# Patient Record
Sex: FEMALE | HISPANIC OR LATINO | Employment: UNEMPLOYED | URBAN - METROPOLITAN AREA
[De-identification: names, ages, dates, MRNs, and addresses within clinical notes are randomized per-mention and may not be internally consistent; named-entity substitution may affect disease eponyms.]

---

## 2020-01-26 ENCOUNTER — APPOINTMENT (OUTPATIENT)
Dept: RADIOLOGY | Facility: MEDICAL CENTER | Age: 50
End: 2020-01-26
Attending: EMERGENCY MEDICINE
Payer: COMMERCIAL

## 2020-01-26 ENCOUNTER — HOSPITAL ENCOUNTER (OUTPATIENT)
Facility: MEDICAL CENTER | Age: 50
End: 2020-01-27
Attending: EMERGENCY MEDICINE | Admitting: SURGERY
Payer: COMMERCIAL

## 2020-01-26 DIAGNOSIS — K81.9 CHOLECYSTITIS: ICD-10-CM

## 2020-01-26 DIAGNOSIS — K81.0 ACUTE CHOLECYSTITIS: ICD-10-CM

## 2020-01-26 PROBLEM — D72.829 LEUKOCYTOSIS: Status: ACTIVE | Noted: 2020-01-26

## 2020-01-26 PROBLEM — E87.6 HYPOKALEMIA: Status: ACTIVE | Noted: 2020-01-26

## 2020-01-26 PROBLEM — I16.0 HYPERTENSIVE URGENCY: Status: ACTIVE | Noted: 2020-01-26

## 2020-01-26 PROBLEM — R73.9 HYPERGLYCEMIA: Status: ACTIVE | Noted: 2020-01-26

## 2020-01-26 LAB
ALBUMIN SERPL BCP-MCNC: 4.5 G/DL (ref 3.2–4.9)
ALBUMIN/GLOB SERPL: 1 G/DL
ALP SERPL-CCNC: 92 U/L (ref 30–99)
ALT SERPL-CCNC: 36 U/L (ref 2–50)
ANION GAP SERPL CALC-SCNC: 15 MMOL/L (ref 0–11.9)
APPEARANCE UR: CLEAR
APTT PPP: 30 SEC (ref 24.7–36)
AST SERPL-CCNC: 27 U/L (ref 12–45)
BACTERIA #/AREA URNS HPF: ABNORMAL /HPF
BASOPHILS # BLD AUTO: 0.3 % (ref 0–1.8)
BASOPHILS # BLD: 0.04 K/UL (ref 0–0.12)
BILIRUB SERPL-MCNC: 0.5 MG/DL (ref 0.1–1.5)
BILIRUB UR QL STRIP.AUTO: NEGATIVE
BUN SERPL-MCNC: 9 MG/DL (ref 8–22)
CALCIUM SERPL-MCNC: 9.7 MG/DL (ref 8.5–10.5)
CHLORIDE SERPL-SCNC: 100 MMOL/L (ref 96–112)
CO2 SERPL-SCNC: 19 MMOL/L (ref 20–33)
COLOR UR: YELLOW
CREAT SERPL-MCNC: 0.62 MG/DL (ref 0.5–1.4)
EOSINOPHIL # BLD AUTO: 0.01 K/UL (ref 0–0.51)
EOSINOPHIL NFR BLD: 0.1 % (ref 0–6.9)
EPI CELLS #/AREA URNS HPF: ABNORMAL /HPF
ERYTHROCYTE [DISTWIDTH] IN BLOOD BY AUTOMATED COUNT: 42.1 FL (ref 35.9–50)
GLOBULIN SER CALC-MCNC: 4.4 G/DL (ref 1.9–3.5)
GLUCOSE SERPL-MCNC: 143 MG/DL (ref 65–99)
GLUCOSE UR STRIP.AUTO-MCNC: NEGATIVE MG/DL
HCT VFR BLD AUTO: 44.7 % (ref 37–47)
HGB BLD-MCNC: 15.1 G/DL (ref 12–16)
HYALINE CASTS #/AREA URNS LPF: ABNORMAL /LPF
IMM GRANULOCYTES # BLD AUTO: 0.06 K/UL (ref 0–0.11)
IMM GRANULOCYTES NFR BLD AUTO: 0.4 % (ref 0–0.9)
INR PPP: 1.07 (ref 0.87–1.13)
KETONES UR STRIP.AUTO-MCNC: ABNORMAL MG/DL
LEUKOCYTE ESTERASE UR QL STRIP.AUTO: NEGATIVE
LIPASE SERPL-CCNC: 10 U/L (ref 11–82)
LYMPHOCYTES # BLD AUTO: 1.78 K/UL (ref 1–4.8)
LYMPHOCYTES NFR BLD: 12.1 % (ref 22–41)
MCH RBC QN AUTO: 29.9 PG (ref 27–33)
MCHC RBC AUTO-ENTMCNC: 33.8 G/DL (ref 33.6–35)
MCV RBC AUTO: 88.5 FL (ref 81.4–97.8)
MICRO URNS: ABNORMAL
MONOCYTES # BLD AUTO: 0.84 K/UL (ref 0–0.85)
MONOCYTES NFR BLD AUTO: 5.7 % (ref 0–13.4)
NEUTROPHILS # BLD AUTO: 12.03 K/UL (ref 2–7.15)
NEUTROPHILS NFR BLD: 81.4 % (ref 44–72)
NITRITE UR QL STRIP.AUTO: NEGATIVE
NRBC # BLD AUTO: 0 K/UL
NRBC BLD-RTO: 0 /100 WBC
PH UR STRIP.AUTO: 6 [PH] (ref 5–8)
PLATELET # BLD AUTO: 360 K/UL (ref 164–446)
PMV BLD AUTO: 10.9 FL (ref 9–12.9)
POTASSIUM SERPL-SCNC: 3.4 MMOL/L (ref 3.6–5.5)
PROT SERPL-MCNC: 8.9 G/DL (ref 6–8.2)
PROT UR QL STRIP: 100 MG/DL
PROTHROMBIN TIME: 14.2 SEC (ref 12–14.6)
RBC # BLD AUTO: 5.05 M/UL (ref 4.2–5.4)
RBC # URNS HPF: ABNORMAL /HPF
RBC UR QL AUTO: ABNORMAL
SODIUM SERPL-SCNC: 134 MMOL/L (ref 135–145)
SP GR UR STRIP.AUTO: 1.02
UROBILINOGEN UR STRIP.AUTO-MCNC: 0.2 MG/DL
WBC # BLD AUTO: 14.8 K/UL (ref 4.8–10.8)
WBC #/AREA URNS HPF: ABNORMAL /HPF

## 2020-01-26 PROCEDURE — 76705 ECHO EXAM OF ABDOMEN: CPT

## 2020-01-26 PROCEDURE — G0378 HOSPITAL OBSERVATION PER HR: HCPCS

## 2020-01-26 PROCEDURE — 85610 PROTHROMBIN TIME: CPT

## 2020-01-26 PROCEDURE — 85025 COMPLETE CBC W/AUTO DIFF WBC: CPT

## 2020-01-26 PROCEDURE — 99226 PR SUBSEQUENT OBSERVATION CARE,LEVEL III: CPT | Performed by: INTERNAL MEDICINE

## 2020-01-26 PROCEDURE — 96375 TX/PRO/DX INJ NEW DRUG ADDON: CPT

## 2020-01-26 PROCEDURE — 96376 TX/PRO/DX INJ SAME DRUG ADON: CPT

## 2020-01-26 PROCEDURE — 85730 THROMBOPLASTIN TIME PARTIAL: CPT

## 2020-01-26 PROCEDURE — 80053 COMPREHEN METABOLIC PANEL: CPT

## 2020-01-26 PROCEDURE — 700111 HCHG RX REV CODE 636 W/ 250 OVERRIDE (IP): Performed by: EMERGENCY MEDICINE

## 2020-01-26 PROCEDURE — 36415 COLL VENOUS BLD VENIPUNCTURE: CPT

## 2020-01-26 PROCEDURE — 700101 HCHG RX REV CODE 250: Performed by: EMERGENCY MEDICINE

## 2020-01-26 PROCEDURE — 96365 THER/PROPH/DIAG IV INF INIT: CPT

## 2020-01-26 PROCEDURE — 83690 ASSAY OF LIPASE: CPT

## 2020-01-26 PROCEDURE — 81001 URINALYSIS AUTO W/SCOPE: CPT

## 2020-01-26 PROCEDURE — 99285 EMERGENCY DEPT VISIT HI MDM: CPT

## 2020-01-26 PROCEDURE — 700105 HCHG RX REV CODE 258: Performed by: EMERGENCY MEDICINE

## 2020-01-26 RX ORDER — DEXTROSE AND SODIUM CHLORIDE 5; .45 G/100ML; G/100ML
INJECTION, SOLUTION INTRAVENOUS CONTINUOUS
Status: DISCONTINUED | OUTPATIENT
Start: 2020-01-26 | End: 2020-01-27 | Stop reason: HOSPADM

## 2020-01-26 RX ORDER — MORPHINE SULFATE 10 MG/ML
5 INJECTION, SOLUTION INTRAMUSCULAR; INTRAVENOUS
Status: COMPLETED | OUTPATIENT
Start: 2020-01-26 | End: 2020-01-27

## 2020-01-26 RX ORDER — ONDANSETRON 2 MG/ML
4 INJECTION INTRAMUSCULAR; INTRAVENOUS ONCE
Status: COMPLETED | OUTPATIENT
Start: 2020-01-26 | End: 2020-01-26

## 2020-01-26 RX ORDER — ONDANSETRON 2 MG/ML
4 INJECTION INTRAMUSCULAR; INTRAVENOUS EVERY 6 HOURS PRN
Status: DISCONTINUED | OUTPATIENT
Start: 2020-01-26 | End: 2020-01-27 | Stop reason: HOSPADM

## 2020-01-26 RX ORDER — ENALAPRILAT 1.25 MG/ML
1.25 INJECTION INTRAVENOUS EVERY 6 HOURS PRN
Status: DISCONTINUED | OUTPATIENT
Start: 2020-01-26 | End: 2020-01-27 | Stop reason: HOSPADM

## 2020-01-26 RX ORDER — LABETALOL HYDROCHLORIDE 5 MG/ML
10 INJECTION, SOLUTION INTRAVENOUS EVERY 4 HOURS PRN
Status: DISCONTINUED | OUTPATIENT
Start: 2020-01-26 | End: 2020-01-27

## 2020-01-26 RX ADMIN — METRONIDAZOLE 500 MG: 500 INJECTION, SOLUTION INTRAVENOUS at 23:17

## 2020-01-26 RX ADMIN — FENTANYL CITRATE 100 MCG: 0.05 INJECTION, SOLUTION INTRAMUSCULAR; INTRAVENOUS at 22:46

## 2020-01-26 RX ADMIN — ONDANSETRON 4 MG: 2 INJECTION INTRAMUSCULAR; INTRAVENOUS at 21:20

## 2020-01-26 RX ADMIN — CEFTRIAXONE SODIUM 1 G: 1 INJECTION, POWDER, FOR SOLUTION INTRAMUSCULAR; INTRAVENOUS at 22:46

## 2020-01-26 RX ADMIN — FENTANYL CITRATE 50 MCG: 0.05 INJECTION, SOLUTION INTRAMUSCULAR; INTRAVENOUS at 21:21

## 2020-01-26 SDOH — HEALTH STABILITY: MENTAL HEALTH: HOW OFTEN DO YOU HAVE A DRINK CONTAINING ALCOHOL?: NEVER

## 2020-01-26 ASSESSMENT — ENCOUNTER SYMPTOMS
ORTHOPNEA: 0
VOMITING: 1
FOCAL WEAKNESS: 0
SPUTUM PRODUCTION: 0
EYE DISCHARGE: 0
CHILLS: 0
HEARTBURN: 0
WEIGHT LOSS: 0
PALPITATIONS: 0
NECK PAIN: 0
EYE REDNESS: 0
DEPRESSION: 0
STRIDOR: 0
BLURRED VISION: 0
FEVER: 0
DIZZINESS: 0
ABDOMINAL PAIN: 1
INSOMNIA: 0
HEADACHES: 0
NERVOUS/ANXIOUS: 0
SEIZURES: 0
MYALGIAS: 0
COUGH: 0
DIARRHEA: 0
NAUSEA: 1
BACK PAIN: 0
EYE PAIN: 0
SHORTNESS OF BREATH: 0

## 2020-01-26 ASSESSMENT — LIFESTYLE VARIABLES: EVER_SMOKED: NEVER

## 2020-01-27 ENCOUNTER — ANESTHESIA EVENT (OUTPATIENT)
Dept: SURGERY | Facility: MEDICAL CENTER | Age: 50
End: 2020-01-27
Payer: COMMERCIAL

## 2020-01-27 ENCOUNTER — ANESTHESIA (OUTPATIENT)
Dept: SURGERY | Facility: MEDICAL CENTER | Age: 50
End: 2020-01-27
Payer: COMMERCIAL

## 2020-01-27 VITALS
TEMPERATURE: 97.2 F | DIASTOLIC BLOOD PRESSURE: 51 MMHG | SYSTOLIC BLOOD PRESSURE: 100 MMHG | BODY MASS INDEX: 32.33 KG/M2 | RESPIRATION RATE: 18 BRPM | HEIGHT: 62 IN | OXYGEN SATURATION: 92 % | WEIGHT: 175.71 LBS | HEART RATE: 78 BPM

## 2020-01-27 PROBLEM — D72.829 LEUKOCYTOSIS: Status: RESOLVED | Noted: 2020-01-26 | Resolved: 2020-01-27

## 2020-01-27 PROBLEM — K81.0 ACUTE CHOLECYSTITIS: Status: RESOLVED | Noted: 2020-01-26 | Resolved: 2020-01-27

## 2020-01-27 PROBLEM — I16.0 HYPERTENSIVE URGENCY: Status: RESOLVED | Noted: 2020-01-26 | Resolved: 2020-01-27

## 2020-01-27 PROBLEM — E87.6 HYPOKALEMIA: Status: RESOLVED | Noted: 2020-01-26 | Resolved: 2020-01-27

## 2020-01-27 PROBLEM — R73.9 HYPERGLYCEMIA: Status: RESOLVED | Noted: 2020-01-26 | Resolved: 2020-01-27

## 2020-01-27 LAB
ALBUMIN SERPL BCP-MCNC: 4.1 G/DL (ref 3.2–4.9)
ALBUMIN/GLOB SERPL: 1.1 G/DL
ALP SERPL-CCNC: 76 U/L (ref 30–99)
ALT SERPL-CCNC: 32 U/L (ref 2–50)
ANION GAP SERPL CALC-SCNC: 12 MMOL/L (ref 0–11.9)
AST SERPL-CCNC: 24 U/L (ref 12–45)
BASOPHILS # BLD AUTO: 0.4 % (ref 0–1.8)
BASOPHILS # BLD: 0.06 K/UL (ref 0–0.12)
BILIRUB SERPL-MCNC: 0.5 MG/DL (ref 0.1–1.5)
BUN SERPL-MCNC: 9 MG/DL (ref 8–22)
CALCIUM SERPL-MCNC: 8.9 MG/DL (ref 8.5–10.5)
CHLORIDE SERPL-SCNC: 101 MMOL/L (ref 96–112)
CO2 SERPL-SCNC: 19 MMOL/L (ref 20–33)
CREAT SERPL-MCNC: 0.68 MG/DL (ref 0.5–1.4)
EOSINOPHIL # BLD AUTO: 0.07 K/UL (ref 0–0.51)
EOSINOPHIL NFR BLD: 0.5 % (ref 0–6.9)
ERYTHROCYTE [DISTWIDTH] IN BLOOD BY AUTOMATED COUNT: 43.8 FL (ref 35.9–50)
GLOBULIN SER CALC-MCNC: 3.6 G/DL (ref 1.9–3.5)
GLUCOSE SERPL-MCNC: 152 MG/DL (ref 65–99)
HCT VFR BLD AUTO: 42.9 % (ref 37–47)
HGB BLD-MCNC: 14.2 G/DL (ref 12–16)
IMM GRANULOCYTES # BLD AUTO: 0.08 K/UL (ref 0–0.11)
IMM GRANULOCYTES NFR BLD AUTO: 0.5 % (ref 0–0.9)
LYMPHOCYTES # BLD AUTO: 1.97 K/UL (ref 1–4.8)
LYMPHOCYTES NFR BLD: 12.9 % (ref 22–41)
MCH RBC QN AUTO: 30.1 PG (ref 27–33)
MCHC RBC AUTO-ENTMCNC: 33.1 G/DL (ref 33.6–35)
MCV RBC AUTO: 91.1 FL (ref 81.4–97.8)
MONOCYTES # BLD AUTO: 1.33 K/UL (ref 0–0.85)
MONOCYTES NFR BLD AUTO: 8.7 % (ref 0–13.4)
NEUTROPHILS # BLD AUTO: 11.74 K/UL (ref 2–7.15)
NEUTROPHILS NFR BLD: 77 % (ref 44–72)
NRBC # BLD AUTO: 0 K/UL
NRBC BLD-RTO: 0 /100 WBC
PATHOLOGY CONSULT NOTE: NORMAL
PLATELET # BLD AUTO: 313 K/UL (ref 164–446)
PMV BLD AUTO: 10.6 FL (ref 9–12.9)
POTASSIUM SERPL-SCNC: 3.3 MMOL/L (ref 3.6–5.5)
PROT SERPL-MCNC: 7.7 G/DL (ref 6–8.2)
RBC # BLD AUTO: 4.71 M/UL (ref 4.2–5.4)
SODIUM SERPL-SCNC: 132 MMOL/L (ref 135–145)
WBC # BLD AUTO: 15.3 K/UL (ref 4.8–10.8)

## 2020-01-27 PROCEDURE — 85025 COMPLETE CBC W/AUTO DIFF WBC: CPT

## 2020-01-27 PROCEDURE — G0378 HOSPITAL OBSERVATION PER HR: HCPCS

## 2020-01-27 PROCEDURE — 700111 HCHG RX REV CODE 636 W/ 250 OVERRIDE (IP): Performed by: INTERNAL MEDICINE

## 2020-01-27 PROCEDURE — 160028 HCHG SURGERY MINUTES - 1ST 30 MINS LEVEL 3: Performed by: SURGERY

## 2020-01-27 PROCEDURE — 700105 HCHG RX REV CODE 258: Performed by: ANESTHESIOLOGY

## 2020-01-27 PROCEDURE — A6402 STERILE GAUZE <= 16 SQ IN: HCPCS | Performed by: SURGERY

## 2020-01-27 PROCEDURE — 160035 HCHG PACU - 1ST 60 MINS PHASE I: Performed by: SURGERY

## 2020-01-27 PROCEDURE — 501838 HCHG SUTURE GENERAL: Performed by: SURGERY

## 2020-01-27 PROCEDURE — 160009 HCHG ANES TIME/MIN: Performed by: SURGERY

## 2020-01-27 PROCEDURE — 80053 COMPREHEN METABOLIC PANEL: CPT

## 2020-01-27 PROCEDURE — 700105 HCHG RX REV CODE 258: Performed by: EMERGENCY MEDICINE

## 2020-01-27 PROCEDURE — 501582 HCHG TROCAR, THRD BLADED: Performed by: SURGERY

## 2020-01-27 PROCEDURE — A9270 NON-COVERED ITEM OR SERVICE: HCPCS | Performed by: NURSE PRACTITIONER

## 2020-01-27 PROCEDURE — 700101 HCHG RX REV CODE 250: Performed by: SURGERY

## 2020-01-27 PROCEDURE — 160039 HCHG SURGERY MINUTES - EA ADDL 1 MIN LEVEL 3: Performed by: SURGERY

## 2020-01-27 PROCEDURE — 700111 HCHG RX REV CODE 636 W/ 250 OVERRIDE (IP): Performed by: ANESTHESIOLOGY

## 2020-01-27 PROCEDURE — 700111 HCHG RX REV CODE 636 W/ 250 OVERRIDE (IP): Performed by: HOSPITALIST

## 2020-01-27 PROCEDURE — A9270 NON-COVERED ITEM OR SERVICE: HCPCS | Performed by: ANESTHESIOLOGY

## 2020-01-27 PROCEDURE — 501584 HCHG TROCAR, THRD CAN&SEAL11X100: Performed by: SURGERY

## 2020-01-27 PROCEDURE — 36415 COLL VENOUS BLD VENIPUNCTURE: CPT

## 2020-01-27 PROCEDURE — 160036 HCHG PACU - EA ADDL 30 MINS PHASE I: Performed by: SURGERY

## 2020-01-27 PROCEDURE — 96375 TX/PRO/DX INJ NEW DRUG ADDON: CPT

## 2020-01-27 PROCEDURE — 700102 HCHG RX REV CODE 250 W/ 637 OVERRIDE(OP): Performed by: ANESTHESIOLOGY

## 2020-01-27 PROCEDURE — 501572 HCHG TROCAR, SHIELD OBTU 5X100: Performed by: SURGERY

## 2020-01-27 PROCEDURE — 501338 HCHG SHEARS, ENDO: Performed by: SURGERY

## 2020-01-27 PROCEDURE — 501583 HCHG TROCAR, THRD CAN&SEAL 5X100: Performed by: SURGERY

## 2020-01-27 PROCEDURE — 160048 HCHG OR STATISTICAL LEVEL 1-5: Performed by: SURGERY

## 2020-01-27 PROCEDURE — 700111 HCHG RX REV CODE 636 W/ 250 OVERRIDE (IP): Performed by: EMERGENCY MEDICINE

## 2020-01-27 PROCEDURE — 160002 HCHG RECOVERY MINUTES (STAT): Performed by: SURGERY

## 2020-01-27 PROCEDURE — 700101 HCHG RX REV CODE 250: Performed by: ANESTHESIOLOGY

## 2020-01-27 PROCEDURE — 501399 HCHG SPECIMAN BAG, ENDO CATC: Performed by: SURGERY

## 2020-01-27 PROCEDURE — 502571 HCHG PACK, LAP CHOLE: Performed by: SURGERY

## 2020-01-27 PROCEDURE — 700102 HCHG RX REV CODE 250 W/ 637 OVERRIDE(OP): Performed by: NURSE PRACTITIONER

## 2020-01-27 PROCEDURE — 99217 PR OBSERVATION CARE DISCHARGE: CPT | Performed by: INTERNAL MEDICINE

## 2020-01-27 PROCEDURE — 88304 TISSUE EXAM BY PATHOLOGIST: CPT

## 2020-01-27 PROCEDURE — 500868 HCHG NEEDLE, SURGI(VARES): Performed by: SURGERY

## 2020-01-27 PROCEDURE — 96376 TX/PRO/DX INJ SAME DRUG ADON: CPT

## 2020-01-27 RX ORDER — KETOROLAC TROMETHAMINE 30 MG/ML
INJECTION, SOLUTION INTRAMUSCULAR; INTRAVENOUS PRN
Status: DISCONTINUED | OUTPATIENT
Start: 2020-01-27 | End: 2020-01-27 | Stop reason: SURG

## 2020-01-27 RX ORDER — SODIUM CHLORIDE, SODIUM LACTATE, POTASSIUM CHLORIDE, CALCIUM CHLORIDE 600; 310; 30; 20 MG/100ML; MG/100ML; MG/100ML; MG/100ML
INJECTION, SOLUTION INTRAVENOUS
Status: DISCONTINUED | OUTPATIENT
Start: 2020-01-27 | End: 2020-01-27 | Stop reason: SURG

## 2020-01-27 RX ORDER — ROCURONIUM BROMIDE 10 MG/ML
INJECTION, SOLUTION INTRAVENOUS PRN
Status: DISCONTINUED | OUTPATIENT
Start: 2020-01-27 | End: 2020-01-27 | Stop reason: SURG

## 2020-01-27 RX ORDER — DEXAMETHASONE SODIUM PHOSPHATE 4 MG/ML
INJECTION, SOLUTION INTRA-ARTICULAR; INTRALESIONAL; INTRAMUSCULAR; INTRAVENOUS; SOFT TISSUE PRN
Status: DISCONTINUED | OUTPATIENT
Start: 2020-01-27 | End: 2020-01-27 | Stop reason: SURG

## 2020-01-27 RX ORDER — AMOXICILLIN AND CLAVULANATE POTASSIUM 875; 125 MG/1; MG/1
1 TABLET, FILM COATED ORAL 2 TIMES DAILY
Qty: 14 TAB | Refills: 0 | Status: SHIPPED | OUTPATIENT
Start: 2020-01-27 | End: 2020-02-03

## 2020-01-27 RX ORDER — OXYCODONE HYDROCHLORIDE AND ACETAMINOPHEN 5; 325 MG/1; MG/1
1 TABLET ORAL ONCE
Status: COMPLETED | OUTPATIENT
Start: 2020-01-27 | End: 2020-01-27

## 2020-01-27 RX ORDER — BUPIVACAINE HYDROCHLORIDE AND EPINEPHRINE 5; 5 MG/ML; UG/ML
INJECTION, SOLUTION EPIDURAL; INTRACAUDAL; PERINEURAL
Status: DISCONTINUED | OUTPATIENT
Start: 2020-01-27 | End: 2020-01-27 | Stop reason: HOSPADM

## 2020-01-27 RX ORDER — SODIUM CHLORIDE, SODIUM LACTATE, POTASSIUM CHLORIDE, CALCIUM CHLORIDE 600; 310; 30; 20 MG/100ML; MG/100ML; MG/100ML; MG/100ML
INJECTION, SOLUTION INTRAVENOUS CONTINUOUS
Status: DISCONTINUED | OUTPATIENT
Start: 2020-01-27 | End: 2020-01-27 | Stop reason: HOSPADM

## 2020-01-27 RX ORDER — DIPHENHYDRAMINE HYDROCHLORIDE 50 MG/ML
12.5 INJECTION INTRAMUSCULAR; INTRAVENOUS
Status: DISCONTINUED | OUTPATIENT
Start: 2020-01-27 | End: 2020-01-27 | Stop reason: HOSPADM

## 2020-01-27 RX ORDER — ONDANSETRON 2 MG/ML
4 INJECTION INTRAMUSCULAR; INTRAVENOUS
Status: COMPLETED | OUTPATIENT
Start: 2020-01-27 | End: 2020-01-27

## 2020-01-27 RX ORDER — PHENYLEPHRINE HYDROCHLORIDE 10 MG/ML
INJECTION, SOLUTION INTRAMUSCULAR; INTRAVENOUS; SUBCUTANEOUS PRN
Status: DISCONTINUED | OUTPATIENT
Start: 2020-01-27 | End: 2020-01-27 | Stop reason: SURG

## 2020-01-27 RX ORDER — OXYCODONE HCL 5 MG/5 ML
5 SOLUTION, ORAL ORAL
Status: COMPLETED | OUTPATIENT
Start: 2020-01-27 | End: 2020-01-27

## 2020-01-27 RX ORDER — OXYCODONE HCL 5 MG/5 ML
10 SOLUTION, ORAL ORAL
Status: COMPLETED | OUTPATIENT
Start: 2020-01-27 | End: 2020-01-27

## 2020-01-27 RX ORDER — HALOPERIDOL 5 MG/ML
1 INJECTION INTRAMUSCULAR
Status: DISCONTINUED | OUTPATIENT
Start: 2020-01-27 | End: 2020-01-27 | Stop reason: HOSPADM

## 2020-01-27 RX ORDER — CEFAZOLIN SODIUM 1 G/3ML
INJECTION, POWDER, FOR SOLUTION INTRAMUSCULAR; INTRAVENOUS PRN
Status: DISCONTINUED | OUTPATIENT
Start: 2020-01-27 | End: 2020-01-27 | Stop reason: SURG

## 2020-01-27 RX ORDER — AMOXICILLIN AND CLAVULANATE POTASSIUM 875; 125 MG/1; MG/1
1 TABLET, FILM COATED ORAL 2 TIMES DAILY
Qty: 14 TAB | Refills: 0 | Status: SHIPPED | OUTPATIENT
Start: 2020-01-27 | End: 2020-01-27 | Stop reason: SDUPTHER

## 2020-01-27 RX ORDER — OXYCODONE HYDROCHLORIDE AND ACETAMINOPHEN 5; 325 MG/1; MG/1
1-2 TABLET ORAL EVERY 4 HOURS PRN
Qty: 35 TAB | Refills: 0 | Status: SHIPPED | OUTPATIENT
Start: 2020-01-27 | End: 2020-02-02

## 2020-01-27 RX ORDER — LABETALOL HYDROCHLORIDE 5 MG/ML
10 INJECTION, SOLUTION INTRAVENOUS EVERY 4 HOURS PRN
Status: DISCONTINUED | OUTPATIENT
Start: 2020-01-27 | End: 2020-01-27 | Stop reason: HOSPADM

## 2020-01-27 RX ORDER — ONDANSETRON 2 MG/ML
INJECTION INTRAMUSCULAR; INTRAVENOUS PRN
Status: DISCONTINUED | OUTPATIENT
Start: 2020-01-27 | End: 2020-01-27 | Stop reason: HOSPADM

## 2020-01-27 RX ORDER — MORPHINE SULFATE 4 MG/ML
2 INJECTION, SOLUTION INTRAMUSCULAR; INTRAVENOUS EVERY 6 HOURS PRN
Status: DISCONTINUED | OUTPATIENT
Start: 2020-01-27 | End: 2020-01-27 | Stop reason: HOSPADM

## 2020-01-27 RX ADMIN — SODIUM CHLORIDE, POTASSIUM CHLORIDE, SODIUM LACTATE AND CALCIUM CHLORIDE: 600; 310; 30; 20 INJECTION, SOLUTION INTRAVENOUS at 08:51

## 2020-01-27 RX ADMIN — ROCURONIUM BROMIDE 30 MG: 10 INJECTION, SOLUTION INTRAVENOUS at 07:26

## 2020-01-27 RX ADMIN — SODIUM CHLORIDE, POTASSIUM CHLORIDE, SODIUM LACTATE AND CALCIUM CHLORIDE: 600; 310; 30; 20 INJECTION, SOLUTION INTRAVENOUS at 09:27

## 2020-01-27 RX ADMIN — PROPOFOL 120 MG: 10 INJECTION, EMULSION INTRAVENOUS at 07:26

## 2020-01-27 RX ADMIN — FENTANYL CITRATE 25 MCG: 0.05 INJECTION, SOLUTION INTRAMUSCULAR; INTRAVENOUS at 09:21

## 2020-01-27 RX ADMIN — OXYCODONE HYDROCHLORIDE 10 MG: 5 SOLUTION ORAL at 09:21

## 2020-01-27 RX ADMIN — ONDANSETRON 4 MG: 2 INJECTION INTRAMUSCULAR; INTRAVENOUS at 08:32

## 2020-01-27 RX ADMIN — MORPHINE SULFATE 2 MG: 4 INJECTION INTRAVENOUS at 05:21

## 2020-01-27 RX ADMIN — FENTANYL CITRATE 25 MCG: 0.05 INJECTION, SOLUTION INTRAMUSCULAR; INTRAVENOUS at 09:28

## 2020-01-27 RX ADMIN — MORPHINE SULFATE 5 MG: 10 INJECTION INTRAVENOUS at 00:22

## 2020-01-27 RX ADMIN — KETOROLAC TROMETHAMINE 30 MG: 30 INJECTION, SOLUTION INTRAMUSCULAR at 07:40

## 2020-01-27 RX ADMIN — ONDANSETRON 4 MG: 2 INJECTION INTRAMUSCULAR; INTRAVENOUS at 09:21

## 2020-01-27 RX ADMIN — EPHEDRINE SULFATE 10 MG: 50 INJECTION INTRAMUSCULAR; INTRAVENOUS; SUBCUTANEOUS at 07:39

## 2020-01-27 RX ADMIN — SODIUM CHLORIDE, POTASSIUM CHLORIDE, SODIUM LACTATE AND CALCIUM CHLORIDE: 600; 310; 30; 20 INJECTION, SOLUTION INTRAVENOUS at 07:23

## 2020-01-27 RX ADMIN — PHENYLEPHRINE HYDROCHLORIDE 100 MCG: 10 INJECTION INTRAVENOUS at 07:39

## 2020-01-27 RX ADMIN — ENALAPRILAT 1.25 MG: 1.25 INJECTION INTRAVENOUS at 00:30

## 2020-01-27 RX ADMIN — FENTANYL CITRATE 100 MCG: 50 INJECTION, SOLUTION INTRAMUSCULAR; INTRAVENOUS at 07:26

## 2020-01-27 RX ADMIN — CEFAZOLIN 2 G: 330 INJECTION, POWDER, FOR SOLUTION INTRAMUSCULAR; INTRAVENOUS at 07:23

## 2020-01-27 RX ADMIN — DEXTROSE AND SODIUM CHLORIDE: 5; 450 INJECTION, SOLUTION INTRAVENOUS at 00:25

## 2020-01-27 RX ADMIN — MIDAZOLAM HYDROCHLORIDE 2 MG: 1 INJECTION, SOLUTION INTRAMUSCULAR; INTRAVENOUS at 07:26

## 2020-01-27 RX ADMIN — DEXAMETHASONE SODIUM PHOSPHATE 4 MG: 4 INJECTION, SOLUTION INTRA-ARTICULAR; INTRALESIONAL; INTRAMUSCULAR; INTRAVENOUS; SOFT TISSUE at 07:40

## 2020-01-27 RX ADMIN — OXYCODONE HYDROCHLORIDE AND ACETAMINOPHEN 1 TABLET: 5; 325 TABLET ORAL at 14:55

## 2020-01-27 ASSESSMENT — PATIENT HEALTH QUESTIONNAIRE - PHQ9
2. FEELING DOWN, DEPRESSED, IRRITABLE, OR HOPELESS: NOT AT ALL
1. LITTLE INTEREST OR PLEASURE IN DOING THINGS: NOT AT ALL
1. LITTLE INTEREST OR PLEASURE IN DOING THINGS: NOT AT ALL
2. FEELING DOWN, DEPRESSED, IRRITABLE, OR HOPELESS: NOT AT ALL
SUM OF ALL RESPONSES TO PHQ9 QUESTIONS 1 AND 2: 0
SUM OF ALL RESPONSES TO PHQ9 QUESTIONS 1 AND 2: 0

## 2020-01-27 ASSESSMENT — LIFESTYLE VARIABLES
DOES PATIENT WANT TO STOP DRINKING: NO
TOTAL SCORE: 0
CONSUMPTION TOTAL: NEGATIVE
HAVE PEOPLE ANNOYED YOU BY CRITICIZING YOUR DRINKING: NO
EVER FELT BAD OR GUILTY ABOUT YOUR DRINKING: NO
TOTAL SCORE: 0
ALCOHOL_USE: NO
ON A TYPICAL DAY WHEN YOU DRINK ALCOHOL HOW MANY DRINKS DO YOU HAVE: 0
TOTAL SCORE: 0
HOW MANY TIMES IN THE PAST YEAR HAVE YOU HAD 5 OR MORE DRINKS IN A DAY: 0
AVERAGE NUMBER OF DAYS PER WEEK YOU HAVE A DRINK CONTAINING ALCOHOL: 0
HAVE YOU EVER FELT YOU SHOULD CUT DOWN ON YOUR DRINKING: NO
EVER HAD A DRINK FIRST THING IN THE MORNING TO STEADY YOUR NERVES TO GET RID OF A HANGOVER: NO

## 2020-01-27 ASSESSMENT — ENCOUNTER SYMPTOMS: ABDOMINAL PAIN: 1

## 2020-01-27 ASSESSMENT — PAIN SCALES - GENERAL: PAIN_LEVEL: 2

## 2020-01-27 NOTE — CONSULTS
Hospital Medicine Consultation    Date of Service  1/26/2020    Referring Physician  No att. providers found    Consulting Physician  Mark Menjivar M.D.    Reason for Consultation  Hypertensive urgency    History of Presenting Illness  49 y.o. female with no significant past medical history who presented 1/26/2020 with right upper quadrant abdominal pain since yesterday.  She described the pain as dull aching pain, 8 out of 10 intensity, radiated to her back, intermittent in nature.  Associated with nausea and vomiting.  In the ER she had ultrasound done and found to have cholelithiasis.  Currently her pain is improving.  Surgery was consulted.  The patient was also being started on antibiotic.  She will be admitted for further management.    Review of Systems  Review of Systems   Constitutional: Negative for chills, fever and weight loss.   HENT: Negative for congestion and nosebleeds.    Eyes: Negative for blurred vision, pain, discharge and redness.   Respiratory: Negative for cough, sputum production, shortness of breath and stridor.    Cardiovascular: Negative for chest pain, palpitations and orthopnea.   Gastrointestinal: Positive for abdominal pain, nausea and vomiting. Negative for diarrhea and heartburn.   Genitourinary: Negative for dysuria, frequency and urgency.   Musculoskeletal: Negative for back pain, myalgias and neck pain.   Skin: Negative for itching and rash.   Neurological: Negative for dizziness, focal weakness, seizures and headaches.   Psychiatric/Behavioral: Negative for depression. The patient is not nervous/anxious and does not have insomnia.        Past Medical History  Reviewed and no pertinent past medical history    Surgical History  Reviewed and no pertinent past surgical history    Family History  Reviewed and no pertinent family history    Social History   reports that she has never smoked. She has never used smokeless tobacco. She reports that she does not drink alcohol or use  drugs.    Medications  Prior to Admission Medications   Prescriptions Last Dose Informant Patient Reported? Taking?   NON SPECIFIED 1/26/2020 at 0800  Yes Yes   Sig: Take 1 Tab by mouth every day. Unknown birth control      Facility-Administered Medications: None       Allergies  No Known Allergies    Physical Exam  Temp:  [36.6 °C (97.9 °F)-37.2 °C (98.9 °F)] 37.2 °C (98.9 °F)  Pulse:  [62-74] 62  Resp:  [14-18] 18  BP: (159-218)/() 218/93  SpO2:  [93 %-97 %] 93 %    Physical Exam  Vitals signs reviewed.   Constitutional:       General: She is not in acute distress.     Appearance: Normal appearance.   HENT:      Head: Normocephalic and atraumatic.      Nose: No congestion or rhinorrhea.   Eyes:      Extraocular Movements: Extraocular movements intact.      Pupils: Pupils are equal, round, and reactive to light.   Neck:      Musculoskeletal: Normal range of motion and neck supple.   Cardiovascular:      Rate and Rhythm: Normal rate and regular rhythm.      Pulses: Normal pulses.   Pulmonary:      Effort: Pulmonary effort is normal. No respiratory distress.      Breath sounds: Normal breath sounds.   Abdominal:      General: Bowel sounds are normal. There is no distension.      Palpations: Abdomen is soft.      Tenderness: There is tenderness.   Musculoskeletal:         General: No swelling or tenderness.   Skin:     General: Skin is warm.      Findings: No erythema.   Neurological:      General: No focal deficit present.      Mental Status: She is alert and oriented to person, place, and time.         Fluids      Laboratory  Recent Labs     01/26/20 1957   WBC 14.8*   RBC 5.05   HEMOGLOBIN 15.1   HEMATOCRIT 44.7   MCV 88.5   MCH 29.9   MCHC 33.8   RDW 42.1   PLATELETCT 360   MPV 10.9     Recent Labs     01/26/20 1957   SODIUM 134*   POTASSIUM 3.4*   CHLORIDE 100   CO2 19*   GLUCOSE 143*   BUN 9   CREATININE 0.62   CALCIUM 9.7     Recent Labs     01/26/20 1957   APTT 30.0   INR 1.07                  Imaging  US-RUQ   Final Result      1. Cholelithiasis. No sonographic evidence of acute cholecystitis.          Assessment/Plan  Hyperglycemia- (present on admission)  Assessment & Plan  Likely stress related    Hypokalemia- (present on admission)  Assessment & Plan  Replete as needed      Leukocytosis- (present on admission)  Assessment & Plan  Related to above    Acute cholecystitis- (present on admission)  Assessment & Plan  The patient has Dunham sign positive  Leukocytosis  Started on ceftriaxone and metronidazole  Pain control  Surgery consulted  N.p.o.    Hypertensive urgency- (present on admission)  Assessment & Plan  No history of hypertension  Her systolic blood pressure is over 200  I started her on IV Vasotec, labetalol with parameter  Titrate medication as needed

## 2020-01-27 NOTE — CARE PLAN
Problem: Pain Management  Goal: Ability to identify factors that increase the pain will improve  Outcome: PROGRESSING AS EXPECTED  Goal: Ability to notify healthcare provider of pain before it becomes unmanageable or unbearable will improve  Outcome: PROGRESSING AS EXPECTED     Problem: Pain Management  Goal: Ability to notify healthcare provider of pain before it becomes unmanageable or unbearable will improve  Outcome: PROGRESSING AS EXPECTED     Problem: Pain Management  Goal: Ability to notify healthcare provider of pain before it becomes unmanageable or unbearable will improve  Outcome: PROGRESSING AS EXPECTED

## 2020-01-27 NOTE — PROGRESS NOTES
2335 Patient seen by Dr. Menjivar, hospitalist at bedside.   2345 Neew orders made and carried out.

## 2020-01-27 NOTE — ASSESSMENT & PLAN NOTE
The patient has Dunham sign positive  Leukocytosis  Started on ceftriaxone and metronidazole  Pain control  Surgery consulted  N.p.o.

## 2020-01-27 NOTE — ASSESSMENT & PLAN NOTE
No history of hypertension  Her systolic blood pressure is over 200  I started her on IV Vasotec, labetalol with parameter  Titrate medication as needed

## 2020-01-27 NOTE — PROGRESS NOTES
Paged hospitalist on call, Dr. Menjivar re: elevated SBP and for assessment of this newly admitted surgical patient.

## 2020-01-27 NOTE — ANESTHESIA PROCEDURE NOTES
Airway  Date/Time: 1/27/2020 7:27 AM  Performed by: Jaspreet Rowe M.D.  Authorized by: Jaspreet Rowe M.D.     Location:  OR  Urgency:  Elective  Indications for Airway Management:  Anesthesia  Spontaneous Ventilation: absent    Sedation Level:  Deep  Preoxygenated: Yes    Patient Position:  Sniffing  Final Airway Type:  Endotracheal airway  Final Endotracheal Airway:  ETT  Cuffed: Yes    Technique Used for Successful ETT Placement:  Direct laryngoscopy  Insertion Site:  Oral  Blade Type:  Kristal  Laryngoscope Blade/Videolaryngoscope Blade Size:  3  ETT Size (mm):  6.5  Measured from:  Teeth  ETT to Teeth (cm):  22  Placement Verified by: auscultation and capnometry    Cormack-Lehane Classification:  Grade I - full view of glottis  Number of Attempts at Approach:  1

## 2020-01-27 NOTE — CARE PLAN
Problem: Pain Management  Goal: Ability to identify factors that increase the pain will improve  Outcome: PROGRESSING AS EXPECTED  Intervention: Identify factors that precipitate, worsen or relieve pain or discomfort. States and carries out methods to decrease pain. Provide pain control measures: pharmacologic and non pharmacologic interventions: (see VS Flowsheet)       Problem: Pain Management  Goal: Ability to notify healthcare provider of pain before it becomes unmanageable or unbearable will improve  Outcome: PROGRESSING AS EXPECTED  Intervention: Report inadequate pain control to health care provider       Problem: Fluid Volume:  Goal: Maintenance of adequate hydration will improve  Outcome: PROGRESSING AS EXPECTED  Intervention: Provide fluid volume management  Note: IVF per orders       Problem: Bowel/Gastric:  Goal: Occurrences of nausea and or vomiting will decrease  Outcome: PROGRESSING AS EXPECTED  Interventions: Manage nausea and/or vomiting  Note: Antiemetics per orders

## 2020-01-27 NOTE — PROGRESS NOTES
Attending Hospitalist is Dr Li starting upon arrival to surgical unit. Please contact this physician for orders, updates or questions today.

## 2020-01-27 NOTE — OR NURSING
Patient A+Ox4. States pain now tolerable 4/10.  Decreased nausea.  ABD soft non distended.  Lap stab sites x4 intact clean and dry.  Ice pack to sites.  Plan for patient to discharge home.  Patient spoke with  #[id number] Stephy and was able to update her with plan for discharge. All her questions were answered. Daughter called to update on patient plan.

## 2020-01-27 NOTE — DISCHARGE SUMMARY
Discharge Summary    CHIEF COMPLAINT ON ADMISSION  Chief Complaint   Patient presents with   • RUQ Pain     x1.5 days, radiates to back. + n/v/d.        Reason for Admission  Flank Pain; N/V      Admission Date  1/26/2020    CODE STATUS  Full Code    HPI & HOSPITAL COURSE  This is a 49 y.o. female here with above medical issues. Patient came in with above issues and was found to have acute cholecystitis. General surgery was consulted. She was noted be quite hypertensive and we were consulted. She was placed on empiric IV ceftriaxone and flagyl and IV anti-HTN medications PRN. She underwent lap bailee without issue. Pathology is pending. Her blood pressure normalized. She will need ongoing antibiotics and pain medications prescribed by the general surgeon. She will need follow up with her PCP for ongoing blood pressure monitoring. Likely reflective of acute surgical need and concurrent pain.         Therefore, she is discharged in fair and stable condition to home with close outpatient follow-up.      Discharge Date  1/27/2020    FOLLOW UP ITEMS POST DISCHARGE  F/U with Dr Rice in 1 week  F/U with her PCP in 1-2 weeks for BP management    DISCHARGE DIAGNOSES  Active Problems:    * No active hospital problems. *  Resolved Problems:    Hypertensive urgency POA: Yes    Acute cholecystitis POA: Yes    Leukocytosis POA: Yes    Hypokalemia POA: Yes    Hyperglycemia POA: Yes      FOLLOW UP  No future appointments.  No follow-up provider specified.    MEDICATIONS ON DISCHARGE     Medication List      START taking these medications      Instructions   amoxicillin-clavulanate 875-125 MG Tabs  Commonly known as:  AUGMENTIN   Take 1 Tab by mouth 2 times a day for 7 days.  Dose:  1 Tab     oxyCODONE-acetaminophen 5-325 MG Tabs  Commonly known as:  PERCOCET   Take 1-2 Tabs by mouth every four hours as needed (Take 1 tablet for moderate pain, 2 tablets for severe pain.) for up to 6 days. Maximum number of tablets per day =  6  Minimum days until refill = 6  Dose:  1-2 Tab        CONTINUE taking these medications      Instructions   NON SPECIFIED   Take 1 Tab by mouth every day. Unknown birth control  Dose:  1 Tab            Allergies  No Known Allergies    DIET  Orders Placed This Encounter   Procedures   • Diet Order Clear Liquid     Standing Status:   Standing     Number of Occurrences:   1     Order Specific Question:   Diet:     Answer:   Clear Liquid [10]       ACTIVITY  As tolerated.  Weight bearing as tolerated    CONSULTATIONS  General surgery    PROCEDURES  As outlined above  US-RUQ   Final Result      1. Cholelithiasis. No sonographic evidence of acute cholecystitis.            LABORATORY  Lab Results   Component Value Date    SODIUM 132 (L) 01/27/2020    POTASSIUM 3.3 (L) 01/27/2020    CHLORIDE 101 01/27/2020    CO2 19 (L) 01/27/2020    GLUCOSE 152 (H) 01/27/2020    BUN 9 01/27/2020    CREATININE 0.68 01/27/2020        Lab Results   Component Value Date    WBC 15.3 (H) 01/27/2020    HEMOGLOBIN 14.2 01/27/2020    HEMATOCRIT 42.9 01/27/2020    PLATELETCT 313 01/27/2020     Total time of the discharge process exceeds 32 minutes.

## 2020-01-27 NOTE — ANESTHESIA POSTPROCEDURE EVALUATION
Patient: Willow Parsons    Procedure Summary     Date:  01/27/20 Room / Location:  [place] [address] / SURGERY [facility]    Anesthesia Start:  0723 Anesthesia Stop:  0907    Procedure:  CHOLECYSTECTOMY, LAPAROSCOPIC (N/A Abdomen) Diagnosis:  (cholecystitis)    Surgeon:  Chai Arevalo M.D. Responsible Provider:  Jaspreet Rowe M.D.    Anesthesia Type:  general ASA Status:  2          Final Anesthesia Type: general  Last vitals  BP   Blood Pressure: 145/54    Temp   36.9 °C (98.5 °F)    Pulse   Pulse: 61   Resp   16    SpO2   95 %      Anesthesia Post Evaluation    Patient location during evaluation: PACU  Patient participation: complete - patient participated  Level of consciousness: awake and alert  Pain score: 2    Airway patency: patent  Anesthetic complications: no  Cardiovascular status: hemodynamically stable  Respiratory status: acceptable  Hydration status: euvolemic    PONV: none           Nurse Pain Score: 6 (NPRS)

## 2020-01-27 NOTE — DISCHARGE INSTRUCTIONS
No lifting greater than 20 pounds for 4 weeks. Use over the counter laxative of choice if constipated. Remove dressing(s) and begins showering on 1/30/2020, but no baths, hot tubs, or swimming until follow up appointment. Follow up with Dr. Arevalo   ([facility], [phone]) in 10-14 days.    Colecistectomía laparoscópica - Cuidados posteriores  (Laparoscopic Cholecystectomy, Care After)  Siga estas instrucciones rosalio las próximas semanas. Estas indicaciones le proporcionan información general acerca de cómo deberá cuidarse después del procedimiento. El médico también podrá darle instrucciones más específicas. El tratamiento oviedo sido planificado según las prácticas médicas actuales, keagan en algunos casos pueden ocurrir problemas. Comuníquese con el médico si tiene algún problema o tiene dudas después del procedimiento.  QUÉ ESPERAR DESPUÉS DEL PROCEDIMIENTO  Después del procedimiento, es común tener las siguientes sensaciones:  · Dolor en los lugares de la incisión. Le darán analgésicos para controlar el dolor.  · Náuseas o vómitos leves. Estos síntomas deberían mejorar después de las primeras 24 horas.  · Meteorismo y posiblemente dolor en el hombro debido al gas que se usa rosalio el procedimiento. Estos síntomas mejorarán después de las primeras 24 horas.  INSTRUCCIONES PARA EL CUIDADO EN EL HOGAR   · Cambie los apósitos (vendajes) sadie [place] le indicó el médico.  · Mantenga la herida limpia y seca. Puede akhil la herida suavemente con agua y jabón. Seque dando palmaditas suaves.  · No se bañe en la bañera, no practique natación ni use el jacuzzy rosalio 2 semanas o hasta que lo autorice el médico.  · [place] solo medicamentos de venta saundra o recetados, según las indicaciones del médico.  · Siga bryan dieta normal según las indicaciones de bryan médico.  · No levante ningún objeto que pese más de 10 libras (4,5 kg) hasta que el médico lo autorice.  · No practique deportes de contacto rosalio 1 semana o  hasta que el médico lo autorice.  SOLICITE ATENCIÓN MÉDICA SI:   · Presenta enrojecimiento, hinchazón o aumento del dolor en la herida.  · Observa aryna secreción de color kaufman amarillento (pus) en la herida.  · Hay aryan secreción en la herida que dura más de 1 día.  · Advierte un olor fétido que proviene de la herida o del vendaje.  · Los de la cruz quirúrgicos (incisiones) se abren.  SOLICITE ATENCIÓN MÉDICA DE INMEDIATO SI:   · Le aparece aryan erupción cutánea.  · Tiene dificultad para respirar.  · Siente dolor en el pecho.  · Tiene fiebre.  · Nota un incremento del dolor en los hombros (en la citlaly donde van los breteles).  · Presenta episodios de mareos o se siente débil cuando está de pie.  · Siente un dolor abdominal intenso.  · Tiene malestar estomacal (náuseas) o vomita y esto dura más de 1 día.     Esta información no tiene [place] fin reemplazar el consejo del médico. Asegúrese de hacerle al médico cualquier pregunta que tenga.     Document Released: 07/30/2012 Document Revised: 10/08/2014  [employer] Interactive Patient Education ©2016 [employer]        Discharge Instructions    Discharged to home by car with relative. Discharged via wheelchair hospital escort: Yes.  Special equipment needed: Not Applicable    Be sure to schedule a follow-up appointment with your primary care doctor or any specialists as instructed.     Discharge Plan:   Diet Plan: Discussed  Activity Level: Discussed  Confirmed Follow up Appointment: Patient to Call and Schedule Appointment  Confirmed Symptoms Management: Discussed  Medication Reconciliation Updated: Yes  Influenza Vaccine Indication: Not indicated: Previously immunized this influenza season and > 8 years of age    I understand that a diet low in cholesterol, fat, and sodium is recommended for good health. Unless I have been given specific instructions below for another diet, I accept this instruction as my diet prescription.   Other diet: Heart healthy     Special Instructions:  None    · Is patient discharged on Warfarin / Coumadin?   No     Depression / Suicide Risk    As you are discharged from this [facility] facility, it is important to learn how to keep safe from harming yourself.    Recognize the warning signs:  · Abrupt changes in personality, positive or negative- including increase in energy   · Giving away possessions  · Change in eating patterns- significant weight changes-  positive or negative  · Change in sleeping patterns- unable to sleep or sleeping all the time   · Unwillingness or inability to communicate  · Depression  · Unusual sadness, discouragement and loneliness  · Talk of wanting to die  · Neglect of personal appearance   · Rebelliousness- reckless behavior  · Withdrawal from people/activities they love  · Confusion- inability to concentrate     If you or a loved one observes any of these behaviors or has concerns about self-harm, here's what you can do:  · Talk about it- your feelings and reasons for harming yourself  · Remove any means that you might use to hurt yourself (examples: pills, rope, extension cords, firearm)  · Get professional help from the community (Mental Health, Substance Abuse, psychological counseling)  · Do not be alone:Call your Safe Contact- someone whom you trust who will be there for you.  · Call your local CRISIS HOTLINE 604-7316 or 103-223-6411  · Call your local Children's Mobile Crisis Response Team [place] (886) 541-4123 or www.Tactiga  · Call the toll free National Suicide Prevention Hotlines   · National Suicide Prevention Lifeline 316-545-YTSW (4106)  · National Hope Line Network 800-SUICIDE (159-8833)

## 2020-01-27 NOTE — OR SURGEON
Immediate Post OP Note    PreOp Diagnosis: Cholecystitis    PostOp Diagnosis: Cholecystitis    Procedure(s):  CHOLECYSTECTOMY, LAPAROSCOPIC - Wound Class: Clean Contaminated    Surgeon(s):  Chai Arevalo M.D.    Anesthesiologist/Type of Anesthesia:  Anesthesiologist: Jaspreet Rowe M.D./General    Surgical Staff:  Circulator: Emiliana Yip  Scrub Person: Monalisa Wilkins R.N.    Specimens removed if any:  ID Type Source Tests Collected by Time Destination   A : gallbladder Tissue Gallbladder PATHOLOGY SPECIMEN Chai Arevalo M.D. 1/27/2020  7:55 AM        Estimated Blood Loss: 15 ml    Findings: Very inflamed tissue surrounding the [place].  Adhesions and scar tissue in the triangle of [place].    Complications: None        1/27/2020 8:56 AM Chai Arevalo M.D.

## 2020-01-27 NOTE — PROGRESS NOTES
2973 Patient into unit via wheelchair accompanied by daughter and son, AOx4, Maltese speaking only, ambulator w/ steady gait, prefers daughter to help with translation. Reports mild to mod constant RUQ abd pain w/ radiation to back, with some ternerness w/ palpation, denies any nausea or vomiting at this time. Declines any pain interventions at this time. POC reviewed and discussed w/ patient and family, verbalizes understanding.  Admit profile and initial assessment done. Medicated per MAR. Patient questions answered at this time. Encouraged to verbalize feelings and needs.

## 2020-01-27 NOTE — H&P
DATE OF ADMISSION:  01/26/2020    CHIEF COMPLAINT:  Right upper quadrant pain.    HISTORY OF PRESENT ILLNESS:  The patient is a 49-year-old female who presented   to the ER yesterday with right upper quadrant abdominal pain.  She states the   pain was 8/10 at its worst, radiating to her back, and waxing and waning in   nature.  She had associated nausea and vomiting of nonbilious material.  She   had a similar episode approximately one week ago, which resolved with time.    She was noted in the ER to have an ultrasound that showed cholelithiasis and   an elevated white blood cell count.  She was admitted to my service and   scheduled for a laparoscopic cholecystectomy.  She states her pain this   morning is much improved and she no longer has nausea or vomiting.    PAST MEDICAL HISTORY:  She denies chronic illness.  She does have a primary   care provider.    PAST SURGICAL HISTORY:  She states she has had no previous surgery.    SOCIAL HISTORY:  She denies tobacco, alcohol, or drug use.  She does not   currently work.    FAMILY HISTORY:  Reviewed and noncontributory.    ALLERGIES:  No known drug allergies.    REVIEW OF SYSTEMS:  A 16-point review of systems is negative except as noted   in the HPI.    PHYSICAL EXAMINATION:  VITAL SIGNS:  Temperature 36.9, heart rate 61, respirations 16, blood pressure   145/54, O2 saturations 95% on room air.  GENERAL:  She is an age appropriate looking female, in no acute distress.  HEENT:  Pupils equal, round, reactive to light.  No scleral icterus.    Extraocular movement is intact.  She has moist oral mucosa and adequate upper   and lower dentition.  NECK:  Supple, no JVD, no lymphadenopathy, no thyromegaly.  LUNGS:  Clear to auscultation bilaterally with normal bilateral chest rise.  HEART:  Has a regular rate and rhythm.  No murmurs, gallops, or rubs.  No   carotid bruits are appreciated.  ABDOMEN:  Nondistended, has positive bowel sounds, soft and is minimally   tender in the  right upper quadrant.  She has no umbilical hernias appreciated.  RECTAL:  Exam is deferred.  PELVIC:  Exam is deferred.  EXTREMITIES:  2+ pulses in all 4 extremities.  No clubbing, cyanosis, or   edema.  NEUROLOGIC:  Cranial nerves II-XII are grossly intact.  She has no focal   deficits.  Her gait is not examined.  LYMPH SYSTEM:  She has no cervical or inguinal lymphadenopathy.  SKIN:  Warm and dry.    LABORATORY DATA:  CBC this morning is remarkable for an elevated white blood   cell count at 15,300 with a left shift of 77% neutrophils.  A comprehensive   metabolic panel is remarkable for a low sodium at 132, low potassium at 3.3,   elevated blood glucose of 152, and an INR is within normal limits at 1.07.  A   urinalysis shows trace of ketones, large amount of occult blood, negative   nitrite and leukocyte esterase, and 2-5 white blood cells per high-powered   field.    IMAGING:  Right upper quadrant ultrasound shows cholelithiasis without   sonographic evidence of cholecystitis.    ASSESSMENT:  A 49-year-old female with cholecystitis.  I have recommended that   this patient undergo a laparoscopic versus open cholecystectomy.  We   discussed the operation itself, risks of the operation, which include   bleeding, infection, damage to bowel, damage to common bile duct, and need for   reoperation.  She states she understands these risks and wished to proceed.    PLAN:  She will be taken to the operating room now for a laparoscopic versus   open cholecystectomy.       ____________________________________     MD SHANICE Williamson / CELESTINO    DD:  01/27/2020 07:02:34  DT:  01/27/2020 07:43:39    D#:  [id number]  Job#:  [id number]

## 2020-01-27 NOTE — OP REPORT
DATE OF SERVICE:  01/27/2020    PREOPERATIVE DIAGNOSIS:  Cholecystitis.    POSTOPERATIVE DIAGNOSIS:  Cholecystitis.    INDICATION FOR SURGERY:  This is a 49-year-old female admitted last night with   a 1-day history of right upper quadrant pain.  She was noted to have   cholelithiasis and an elevated white blood cell count on her initial workup   and was brought to the operating room today for a planned laparoscopic versus   open cholecystectomy.    PROCEDURE:  Laparoscopic cholecystectomy.    SURGEON:  Chai Arevalo MD    ASSISTANT:  None.    ANESTHESIOLOGIST:  Jaspreet Rowe MD    ANESTHESIA:  General endotracheal anesthesia.    FINDINGS:  Scar tissue and inflammation in the triangle of Calot.  Distended   gallbladder.  Thickened gallbladder wall.  Adhesions of the duodenum and   omentum to the gallbladder.    PROCEDURE NARRATIVE:  Signed informed consent was on the chart at the time of   the procedure.  The patient was brought to the operating room and placed in   the supine position.  General endotracheal anesthesia was induced.  The skin   over the abdomen was prepped and draped in a sterile fashion.  A time-out was   performed.  The patient was noted to have received Rocephin approximately 12   hours before and was given an additional 2 grams of Ancef IV preoperatively   after first infusing the skin and soft tissue with local anesthetic, which in   this case was 0.5% Marcaine with epinephrine.  A 2 cm periumbilical incision   was made superior to the umbilicus.  The underlying soft tissue was dissected   through bluntly to the level of the fascia.  The base stalk of the umbilicus   was grasped and lifted and a Veress needle was introduced into the peritoneal   space on the first attempt.  Pneumoperitoneum was achieved without difficulty   and the Veress needle was replaced with an 11 mm trocar, through which the   laparoscopic camera was placed and the underlying viscus was inspected.  No    evidence of trauma was appreciated.  After first infusing the skin and soft   tissue with local anesthetic, a 2 cm horizontal incision was made just to the   patient's right of midline inferior to the costal margin, through which an 11   mm port was placed under direct visualization via the camera.  Two separate 1   cm incisions were made in the right upper quadrant and two 5 mm ports were   placed using similar technique.  Using these as access for grasping   instruments, the gallbladder was grasped at the fundus and lifted.  The   adhesions of the omentum to the gallbladder and liver were taken down using   careful Bovie cautery.  Once these were taken down, the infundibulum was able   to be grasped and placed under gentle tension to the patient's right.  The   cystic duct and cystic artery were then addressed.  The cystic artery actually   was lying in front of the cystic duct and this was carefully dissected away   from the surrounding soft tissue and clipped twice proximally, once distally,   and divided between the 2 groups of clips.  The cystic duct had folded on   itself and the adhesions around this were quite dense.  These were carefully   dissected free of the cystic duct until the cystic duct could be straightened   and its anatomy made clear.  During this portion of the procedure, the   gallbladder was entered with spillage of clear-appearing fluid.  Eventually, I   was able to delineate the anatomy of the cystic duct and obtain a critical   view.  The cystic duct was then clipped 3 times distally and once proximally,   being careful not to impinge on the common bile duct.  Cystic duct was divided   between the 2 groups of clips.  The peritoneal attachments on either side of   the gallbladder as well as the soft tissue attachments between the gallbladder   and gallbladder fossa were taken down using careful Bovie cautery.  The   gallbladder was eventually dissected completely free of the gallbladder  fossa,   placed into an EndoCatch bag and withdrawn using the periumbilical port site   as access.  The gallbladder fossa was then irrigated and some very minimal   bleeding was addressed with careful Bovie cautery.  The right upper quadrant   was eventually irrigated with a total of 2 liters of warm normal saline.  The   irrigant eventually returned clear.  There was no evidence of ongoing bleeding   or leakage of bile.  The lap, needle, and instrument counts were noted to be   correct.  The superior midline port was removed and using an EndoClose device   under direct visualization via the camera, an 0 Vicryl suture was placed   across the fascial defect.  The suture was tagged and the port was replaced   and used as access for the camera.  The periumbilical port was removed and   using an EndoClose device under direct visualization via the camera, 3   separate 0 Vicryl sutures were placed across the fascial defect.  These were   tightened and tied being careful not to include any underlying structures.    The two separate 5 mm ports were both removed and no evidence of bleeding was   noted on laparoscopic inspection.  The superior midline port was then opened   and removed as pneumoperitoneum was allowed to .  Each of the 4   incisions was irrigated and dried and each was closed with a running 4-0   Vicryl subcuticular stitch.  Each of the incision was dressed with a 2x2 gauze   pad followed by clear Tegaderm.    FLUIDS:  1000 mL crystalloid.    ESTIMATED BLOOD LOSS:  15 mL    DRAINS:  None.    SPECIMENS:  Gallbladder to pathology.    COMPLICATIONS:  None.    DISPOSITION:  Patient was in stable condition to postanesthesia care unit.       ____________________________________     MD SHANICE Williamson / CELESTINO    DD:  2020 09:15:47  DT:  2020 09:37:23    D#:  [id number]  Job#:  [id number]

## 2020-01-27 NOTE — PROGRESS NOTES
Patient scheduled for Lap Dinah today @ 0715. Report given to Pre-op RN. Pre-op checklist to complete. Patient and daughter updated on surgery schedule, patient yet to be seen by Surgeon and Anesthesia at pre-op, patient agreaable to care.

## 2020-01-27 NOTE — PROGRESS NOTES
Pt returned from PACU. Family at bedside. Pt AAO x 4. VVS. Lap stab, CDI, pain controlled and denies N/V

## 2020-01-27 NOTE — ANESTHESIA TIME REPORT
Anesthesia Start and Stop Event Times     Date Time Event    1/27/2020 0723 Anesthesia Start     0907 Anesthesia Stop        Responsible Staff  01/27/20    Name Role Begin End    Jaspreet Rowe M.D. Anesth 0723 0907        Preop Diagnosis (Free Text):  Pre-op Diagnosis     cholecystitis        Preop Diagnosis (Codes):    Post op Diagnosis  Cholecystitis, chronic      Premium Reason  Non-Premium    Comments:

## 2020-01-27 NOTE — PROGRESS NOTES
Pre-op checklist completed, CHG bath given, gown on patient only, no dentures or jewelries, patient toileted, oral care offered. Maintained on NPO. Surgical and anesthesia consent to obtain in Pre-op.

## 2020-01-27 NOTE — PROGRESS NOTES
Surgical Progress Note    Author: Chai Arevalo M.D. Date & Time created: 2020   6:53 AM     Interval Events:  Admitted to CDU, Scheduled for surgery.  Admitted with RIQ pain, diagnosed with cholecystitis.  RUQ pain remains, improved.    Review of Systems   Gastrointestinal: Positive for abdominal pain.     Hemodynamics:  Temp (24hrs), Av.9 °C (98.4 °F), Min:36.6 °C (97.9 °F), Max:37.2 °C (98.9 °F)  Temperature: 36.9 °C (98.5 °F)  Pulse  Av.4  Min: 60  Max: 74   Blood Pressure: 145/54     Respiratory:    Respiration: 16, Pulse Oximetry: 95 %           Neuro:  GCS = 15       Fluids:  No intake or output data in the 24 hours ending 20 0653  Weight: 79.7 kg (175 lb 11.3 oz)  Current Diet Order   Procedures   • DIET NPO     Physical Exam  Constitutional:       General: She is not in acute distress.     Appearance: Normal appearance. She is normal weight. She is not ill-appearing or toxic-appearing.   HENT:      Mouth/Throat:      Mouth: Mucous membranes are moist.   Eyes:      Pupils: Pupils are equal, round, and reactive to light.   Cardiovascular:      Rate and Rhythm: Normal rate and regular rhythm.   Pulmonary:      Effort: Pulmonary effort is normal.      Breath sounds: Normal breath sounds.   Abdominal:      General: Abdomen is flat. Bowel sounds are normal. There is no distension.      Palpations: Abdomen is soft.      Tenderness: There is tenderness (RUQ, mild). There is no guarding or rebound.      Hernia: No hernia is present.   Skin:     General: Skin is warm and dry.   Neurological:      General: No focal deficit present.      Mental Status: She is alert.   Psychiatric:         Behavior: Behavior normal.       Labs:  Recent Results (from the past 24 hour(s))   CBC WITH DIFFERENTIAL    Collection Time: 20  7:57 PM   Result Value Ref Range    WBC 14.8 (H) 4.8 - 10.8 K/uL    RBC 5.05 4.20 - 5.40 M/uL    Hemoglobin 15.1 12.0 - 16.0 g/dL    Hematocrit 44.7 37.0 - 47.0 %    MCV  88.5 81.4 - 97.8 fL    MCH 29.9 27.0 - 33.0 pg    MCHC 33.8 33.6 - 35.0 g/dL    RDW 42.1 35.9 - 50.0 fL    Platelet Count 360 164 - 446 K/uL    MPV 10.9 9.0 - 12.9 fL    Neutrophils-Polys 81.40 (H) 44.00 - 72.00 %    Lymphocytes 12.10 (L) 22.00 - 41.00 %    Monocytes 5.70 0.00 - 13.40 %    Eosinophils 0.10 0.00 - 6.90 %    Basophils 0.30 0.00 - 1.80 %    Immature Granulocytes 0.40 0.00 - 0.90 %    Nucleated RBC 0.00 /100 WBC    Neutrophils (Absolute) 12.03 (H) 2.00 - 7.15 K/uL    Lymphs (Absolute) 1.78 1.00 - 4.80 K/uL    Monos (Absolute) 0.84 0.00 - 0.85 K/uL    Eos (Absolute) 0.01 0.00 - 0.51 K/uL    Baso (Absolute) 0.04 0.00 - 0.12 K/uL    Immature Granulocytes (abs) 0.06 0.00 - 0.11 K/uL    NRBC (Absolute) 0.00 K/uL   COMP METABOLIC PANEL    Collection Time: 01/26/20  7:57 PM   Result Value Ref Range    Sodium 134 (L) 135 - 145 mmol/L    Potassium 3.4 (L) 3.6 - 5.5 mmol/L    Chloride 100 96 - 112 mmol/L    Co2 19 (L) 20 - 33 mmol/L    Anion Gap 15.0 (H) 0.0 - 11.9    Glucose 143 (H) 65 - 99 mg/dL    Bun 9 8 - 22 mg/dL    Creatinine 0.62 0.50 - 1.40 mg/dL    Calcium 9.7 8.5 - 10.5 mg/dL    AST(SGOT) 27 12 - 45 U/L    ALT(SGPT) 36 2 - 50 U/L    Alkaline Phosphatase 92 30 - 99 U/L    Total Bilirubin 0.5 0.1 - 1.5 mg/dL    Albumin 4.5 3.2 - 4.9 g/dL    Total Protein 8.9 (H) 6.0 - 8.2 g/dL    Globulin 4.4 (H) 1.9 - 3.5 g/dL    A-G Ratio 1.0 g/dL   LIPASE    Collection Time: 01/26/20  7:57 PM   Result Value Ref Range    Lipase 10 (L) 11 - 82 U/L   APTT    Collection Time: 01/26/20  7:57 PM   Result Value Ref Range    APTT 30.0 24.7 - 36.0 sec   PROTHROMBIN TIME (INR)    Collection Time: 01/26/20  7:57 PM   Result Value Ref Range    PT 14.2 12.0 - 14.6 sec    INR 1.07 0.87 - 1.13   ESTIMATED GFR    Collection Time: 01/26/20  7:57 PM   Result Value Ref Range    GFR If African American >60 >60 mL/min/1.73 m 2    GFR If Non African American >60 >60 mL/min/1.73 m 2   URINALYSIS,CULTURE IF INDICATED    Collection Time:  01/26/20  8:23 PM   Result Value Ref Range    Color Yellow     Character Clear     Specific Gravity 1.025 <1.035    Ph 6.0 5.0 - 8.0    Glucose Negative Negative mg/dL    Ketones Trace (A) Negative mg/dL    Protein 100 (A) Negative mg/dL    Bilirubin Negative Negative    Urobilinogen, Urine 0.2 Negative    Nitrite Negative Negative    Leukocyte Esterase Negative Negative    Occult Blood Large (A) Negative    Micro Urine Req Microscopic    URINE MICROSCOPIC (W/UA)    Collection Time: 01/26/20  8:23 PM   Result Value Ref Range    WBC 2-5 /hpf    RBC 5-10 (A) /hpf    Bacteria Few (A) None /hpf    Epithelial Cells Few /hpf    Hyaline Cast 0-2 /lpf   CBC WITH DIFFERENTIAL    Collection Time: 01/27/20  5:10 AM   Result Value Ref Range    WBC 15.3 (H) 4.8 - 10.8 K/uL    RBC 4.71 4.20 - 5.40 M/uL    Hemoglobin 14.2 12.0 - 16.0 g/dL    Hematocrit 42.9 37.0 - 47.0 %    MCV 91.1 81.4 - 97.8 fL    MCH 30.1 27.0 - 33.0 pg    MCHC 33.1 (L) 33.6 - 35.0 g/dL    RDW 43.8 35.9 - 50.0 fL    Platelet Count 313 164 - 446 K/uL    MPV 10.6 9.0 - 12.9 fL    Neutrophils-Polys 77.00 (H) 44.00 - 72.00 %    Lymphocytes 12.90 (L) 22.00 - 41.00 %    Monocytes 8.70 0.00 - 13.40 %    Eosinophils 0.50 0.00 - 6.90 %    Basophils 0.40 0.00 - 1.80 %    Immature Granulocytes 0.50 0.00 - 0.90 %    Nucleated RBC 0.00 /100 WBC    Neutrophils (Absolute) 11.74 (H) 2.00 - 7.15 K/uL    Lymphs (Absolute) 1.97 1.00 - 4.80 K/uL    Monos (Absolute) 1.33 (H) 0.00 - 0.85 K/uL    Eos (Absolute) 0.07 0.00 - 0.51 K/uL    Baso (Absolute) 0.06 0.00 - 0.12 K/uL    Immature Granulocytes (abs) 0.08 0.00 - 0.11 K/uL    NRBC (Absolute) 0.00 K/uL   Comp Metabolic Panel    Collection Time: 01/27/20  5:10 AM   Result Value Ref Range    Sodium 132 (L) 135 - 145 mmol/L    Potassium 3.3 (L) 3.6 - 5.5 mmol/L    Chloride 101 96 - 112 mmol/L    Co2 19 (L) 20 - 33 mmol/L    Anion Gap 12.0 (H) 0.0 - 11.9    Glucose 152 (H) 65 - 99 mg/dL    Bun 9 8 - 22 mg/dL    Creatinine 0.68 0.50 -  1.40 mg/dL    Calcium 8.9 8.5 - 10.5 mg/dL    AST(SGOT) 24 12 - 45 U/L    ALT(SGPT) 32 2 - 50 U/L    Alkaline Phosphatase 76 30 - 99 U/L    Total Bilirubin 0.5 0.1 - 1.5 mg/dL    Albumin 4.1 3.2 - 4.9 g/dL    Total Protein 7.7 6.0 - 8.2 g/dL    Globulin 3.6 (H) 1.9 - 3.5 g/dL    A-G Ratio 1.1 g/dL   ESTIMATED GFR    Collection Time: 01/27/20  5:10 AM   Result Value Ref Range    GFR If African American >60 >60 mL/min/1.73 m 2    GFR If Non African American >60 >60 mL/min/1.73 m 2     Medical Decision Making, by Problem:  Active Hospital Problems    Diagnosis   • Hypertensive urgency [I16.0]   • Acute cholecystitis [K81.0]   • Leukocytosis [D72.829]   • Hypokalemia [E87.6]   • Hyperglycemia [R73.9]     Plan:  To the OR now for laparoscopic vs open cholecystectomy    Quality Measures:  Quality-Core Measures   Reviewed items::  Labs reviewed, Radiology images reviewed and Medications reviewed  Liang catheter::  No Liang  DVT prophylaxis pharmacological::  Contraindicated - High bleeding risk      Discussed patient condition with Family and Patient

## 2020-01-27 NOTE — ANESTHESIA PREPROCEDURE EVALUATION
Relevant Problems   CARDIAC   (+) Hypertensive urgency       Physical Exam    Airway   Mallampati: II  TM distance: >3 FB  Neck ROM: full       Cardiovascular - normal exam  Rhythm: regular  Rate: normal  (-) murmur     Dental - normal exam         Pulmonary - normal exam  Breath sounds clear to auscultation     Abdominal    Neurological - normal exam                 Anesthesia Plan    ASA 2       Plan - general       Airway plan will be ETT      Plan Factors:   Patient was not previously instructed to abstain from smoking on day of procedure.  Patient did not smoke on day of procedure.      Induction: intravenous    Postoperative Plan: Postoperative administration of opioids is intended.    Pertinent diagnostic labs and testing reviewed    Informed Consent:    Anesthetic plan and risks discussed with patient.    Use of blood products discussed with: patient whom consented to blood products.

## 2020-01-27 NOTE — ED TRIAGE NOTES
Chief Complaint   Patient presents with   • RUQ Pain     x1.5 days, radiates to back. + n/v/d.      Pt ambulatory to triage with family for above complaints. Pt is Citizen of [place] speaking, family interpreting.  offered , pt and family declined. Educated on triage process, encouraged to inform staff of any changes.

## 2020-01-28 NOTE — H&P
DATE OF ADMISSION:  01/26/2020    DATE SEEN:  01/27/2020    CHIEF COMPLAINT:  Right upper quadrant pain.    HISTORY OF PRESENT ILLNESS:  The patient is a 49-year-old female who presented   to the ER yesterday with right upper quadrant abdominal pain.  She states the   pain was 8/10 at its worst, radiating to her back, and waxing and waning in   nature.  She had associated nausea and vomiting of nonbilious material.  She   had a similar episode approximately one week ago, which resolved with time.    She was noted in the ER to have an ultrasound that showed cholelithiasis and   an elevated white blood cell count.  She was admitted to my service and   scheduled for a laparoscopic cholecystectomy.  She states her pain this   morning is much improved and she no longer has nausea or vomiting.    PAST MEDICAL HISTORY:  She denies chronic illness.  She does have a primary   care provider.    PAST SURGICAL HISTORY:  She states she has had no previous surgery.    SOCIAL HISTORY:  She denies tobacco, alcohol, or drug use.  She does not   currently work.    FAMILY HISTORY:  Reviewed and noncontributory.    ALLERGIES:  No known drug allergies.    REVIEW OF SYSTEMS:  A 16-point review of systems is negative except as noted   in the HPI.    PHYSICAL EXAMINATION:  VITAL SIGNS:  Temperature 36.9, heart rate 61, respirations 16, blood pressure   145/54, O2 saturations 95% on room air.  GENERAL:  She is an age-appropriate-looking female, in no acute distress.  HEENT:  Pupils equal, round, reactive to light.  No scleral icterus.    Extraocular movement is intact.  She has moist oral mucosa and adequate upper   and lower dentition.  NECK:  Supple, no JVD, no lymphadenopathy, no thyromegaly.  LUNGS:  Clear to auscultation bilaterally with normal bilateral chest rise.  HEART:  Has a regular rate and rhythm.  No murmurs, gallops, or rubs.  No   carotid bruits are appreciated.  ABDOMEN:  Nondistended, has positive bowel sounds, soft and is  minimally   tender in the right upper quadrant.  She has no umbilical hernias appreciated.  RECTAL:  Exam is deferred.  PELVIC:  Exam is deferred.  EXTREMITIES:  2+ pulses in all 4 extremities.  No clubbing, cyanosis, or   edema.  NEUROLOGIC:  Cranial nerves II-XII are grossly intact.  She has no focal   deficits.  Her gait is not examined.  LYMPH SYSTEM:  She has no cervical or inguinal lymphadenopathy.  SKIN:  Warm and dry.    LABORATORY DATA:  CBC this morning is remarkable for an elevated white blood   cell count at 15,300 with a left shift of 77% neutrophils.  A comprehensive   metabolic panel is remarkable for a low sodium at 132, low potassium at 3.3,   elevated blood glucose of 152, and an INR is within normal limits at 1.07.  A   urinalysis shows trace of ketones, large amount of occult blood, negative   nitrite and leukocyte esterase, and 2-5 white blood cells per high-power   field.    IMAGING:  Right upper quadrant ultrasound shows cholelithiasis without   sonographic evidence of cholecystitis.    ASSESSMENT:  A 49-year-old female with cholecystitis.  I have recommended that   this patient undergo a laparoscopic versus open cholecystectomy.  We   discussed the operation itself, risks of the operation, which include   bleeding, infection, damage to bowel, damage to common bile duct, and need for   reoperation.  She states she understands these risks and wished to proceed.    PLAN:  She will be taken to the operating room now for a laparoscopic versus   open cholecystectomy.             ____________________________________     MD SHANICE Williamson / CELESTINO    DD:  01/27/2020 07:02:34  DT:  01/27/2020 07:43:39    D#:  [id number]  Job#:  [id number]

## 2020-02-25 ENCOUNTER — HOSPITAL ENCOUNTER (OUTPATIENT)
Dept: LAB | Facility: MEDICAL CENTER | Age: 50
End: 2020-02-25
Attending: SURGERY
Payer: COMMERCIAL

## 2020-02-25 ENCOUNTER — HOSPITAL ENCOUNTER (OUTPATIENT)
Dept: RADIOLOGY | Facility: MEDICAL CENTER | Age: 50
End: 2020-02-25
Attending: SURGERY
Payer: COMMERCIAL

## 2020-02-25 DIAGNOSIS — Z90.49 STATUS POST CHOLECYSTECTOMY: ICD-10-CM

## 2020-02-25 DIAGNOSIS — M54.50 LOW BACK PAIN, UNSPECIFIED BACK PAIN LATERALITY, UNSPECIFIED CHRONICITY, UNSPECIFIED WHETHER SCIATICA PRESENT: ICD-10-CM

## 2020-02-25 LAB
ALBUMIN SERPL BCP-MCNC: 4.5 G/DL (ref 3.2–4.9)
ALBUMIN/GLOB SERPL: 1.2 G/DL
ALP SERPL-CCNC: 187 U/L (ref 30–99)
ALT SERPL-CCNC: 354 U/L (ref 2–50)
ANION GAP SERPL CALC-SCNC: 13 MMOL/L (ref 0–11.9)
AST SERPL-CCNC: 172 U/L (ref 12–45)
BASOPHILS # BLD AUTO: 1 % (ref 0–1.8)
BASOPHILS # BLD: 0.08 K/UL (ref 0–0.12)
BILIRUB SERPL-MCNC: 0.9 MG/DL (ref 0.1–1.5)
BUN SERPL-MCNC: 6 MG/DL (ref 8–22)
CALCIUM SERPL-MCNC: 9.7 MG/DL (ref 8.5–10.5)
CHLORIDE SERPL-SCNC: 100 MMOL/L (ref 96–112)
CO2 SERPL-SCNC: 25 MMOL/L (ref 20–33)
CREAT SERPL-MCNC: 0.58 MG/DL (ref 0.5–1.4)
EOSINOPHIL # BLD AUTO: 0.09 K/UL (ref 0–0.51)
EOSINOPHIL NFR BLD: 1.2 % (ref 0–6.9)
ERYTHROCYTE [DISTWIDTH] IN BLOOD BY AUTOMATED COUNT: 46.8 FL (ref 35.9–50)
FASTING STATUS PATIENT QL REPORTED: NORMAL
GLOBULIN SER CALC-MCNC: 3.8 G/DL (ref 1.9–3.5)
GLUCOSE SERPL-MCNC: 114 MG/DL (ref 65–99)
HCT VFR BLD AUTO: 43.9 % (ref 37–47)
HGB BLD-MCNC: 13.9 G/DL (ref 12–16)
IMM GRANULOCYTES # BLD AUTO: 0.02 K/UL (ref 0–0.11)
IMM GRANULOCYTES NFR BLD AUTO: 0.3 % (ref 0–0.9)
LYMPHOCYTES # BLD AUTO: 1.48 K/UL (ref 1–4.8)
LYMPHOCYTES NFR BLD: 19 % (ref 22–41)
MCH RBC QN AUTO: 29.3 PG (ref 27–33)
MCHC RBC AUTO-ENTMCNC: 31.7 G/DL (ref 33.6–35)
MCV RBC AUTO: 92.6 FL (ref 81.4–97.8)
MONOCYTES # BLD AUTO: 0.45 K/UL (ref 0–0.85)
MONOCYTES NFR BLD AUTO: 5.8 % (ref 0–13.4)
NEUTROPHILS # BLD AUTO: 5.65 K/UL (ref 2–7.15)
NEUTROPHILS NFR BLD: 72.7 % (ref 44–72)
NRBC # BLD AUTO: 0 K/UL
NRBC BLD-RTO: 0 /100 WBC
PLATELET # BLD AUTO: 317 K/UL (ref 164–446)
PMV BLD AUTO: 11.1 FL (ref 9–12.9)
POTASSIUM SERPL-SCNC: 3.9 MMOL/L (ref 3.6–5.5)
PROT SERPL-MCNC: 8.3 G/DL (ref 6–8.2)
RBC # BLD AUTO: 4.74 M/UL (ref 4.2–5.4)
SODIUM SERPL-SCNC: 138 MMOL/L (ref 135–145)
WBC # BLD AUTO: 7.8 K/UL (ref 4.8–10.8)

## 2020-02-25 PROCEDURE — 36415 COLL VENOUS BLD VENIPUNCTURE: CPT

## 2020-02-25 PROCEDURE — 85025 COMPLETE CBC W/AUTO DIFF WBC: CPT

## 2020-02-25 PROCEDURE — 76705 ECHO EXAM OF ABDOMEN: CPT

## 2020-02-25 PROCEDURE — 80053 COMPREHEN METABOLIC PANEL: CPT

## 2020-03-11 ENCOUNTER — HOSPITAL ENCOUNTER (OUTPATIENT)
Dept: LAB | Facility: MEDICAL CENTER | Age: 50
End: 2020-03-11
Attending: SURGERY
Payer: COMMERCIAL

## 2020-03-11 PROCEDURE — 80053 COMPREHEN METABOLIC PANEL: CPT

## 2020-03-11 PROCEDURE — 36415 COLL VENOUS BLD VENIPUNCTURE: CPT

## 2020-03-18 ENCOUNTER — HOSPITAL ENCOUNTER (OUTPATIENT)
Dept: RADIOLOGY | Facility: MEDICAL CENTER | Age: 50
End: 2020-03-18
Attending: SURGERY
Payer: COMMERCIAL

## 2020-03-18 DIAGNOSIS — E80.7 DISORDER OF BILIRUBIN METABOLISM: ICD-10-CM

## 2020-03-18 PROCEDURE — 74181 MRI ABDOMEN W/O CONTRAST: CPT

## (undated) DEVICE — SET EXTENSION WITH 2 PORTS (48EA/CA) ***PART #2C8610 IS A SUBSTITUTE*****

## (undated) DEVICE — CANNULA W/SEAL11X100ZTHREAD - (12/BX)

## (undated) DEVICE — PROTECTOR ULNA NERVE - (36PR/CA)

## (undated) DEVICE — NEPTUNE 4 PORT MANIFOLD - (20/PK)

## (undated) DEVICE — TUBING CLEARLINK DUO-VENT - C-FLO (48EA/CA)

## (undated) DEVICE — SENSOR SPO2 NEO LNCS ADHESIVE (20/BX) SEE USER NOTES

## (undated) DEVICE — CANNULA W/SEAL 5X100 Z-THRE - ADED KII (12/BX)

## (undated) DEVICE — SODIUM CHL IRRIGATION 0.9% 1000ML (12EA/CA)

## (undated) DEVICE — SUTURE 4-0 VICRYL PLUS FS-2 - 27 INCH (36/BX)

## (undated) DEVICE — DRESSING TRANSPARENT FILM TEGADERM 2.375 X 2.75"  (100EA/BX)"

## (undated) DEVICE — SUTURE GENERAL

## (undated) DEVICE — SET SUCTION/IRRIGATION WITH DISPOSABLE TIP (6/CA )PART #0250-070-520 IS A SUB

## (undated) DEVICE — SET LEADWIRE 5 LEAD BEDSIDE DISPOSABLE ECG (1SET OF 5/EA)

## (undated) DEVICE — SUTURE 0 VICRYL PLUS UR-6 - 27 INCH (36/BX)

## (undated) DEVICE — ELECTRODE 850 FOAM ADHESIVE - HYDROGEL RADIOTRNSPRNT (50/PK)

## (undated) DEVICE — CHLORAPREP 26 ML APPLICATOR - ORANGE TINT(25/CA)

## (undated) DEVICE — HEAD HOLDER JUNIOR/ADULT

## (undated) DEVICE — TUBE CONNECT SUCTION CLEAR 120 X 1/4" (50EA/CA)"

## (undated) DEVICE — BLADE SURGICAL CLIPPER - (50EA/CA)

## (undated) DEVICE — SUCTION INSTRUMENT YANKAUER BULBOUS TIP W/O VENT (50EA/CA)

## (undated) DEVICE — GLOVE BIOGEL SZ 8 SURGICAL PF LTX - (50PR/BX 4BX/CA)

## (undated) DEVICE — GOWN WARMING STANDARD FLEX - (30/CA)

## (undated) DEVICE — LACTATED RINGERS INJ 1000 ML - (14EA/CA 60CA/PF)

## (undated) DEVICE — TUBE E-T HI-LO CUFF 6.5MM (10EA/BX)

## (undated) DEVICE — DRAPESURG STERI-DRAPE LONG - (10/BX 4BX/CA)

## (undated) DEVICE — CANISTER SUCTION 3000ML MECHANICAL FILTER AUTO SHUTOFF MEDI-VAC NONSTERILE LF DISP  (40EA/CA)

## (undated) DEVICE — TROCAR 5X100 BLADED Z-THREAD - KII (6/BX)

## (undated) DEVICE — GLOVE BIOGEL PI INDICATOR SZ 6.5 SURGICAL PF LF - (50/BX 4BX/CA)

## (undated) DEVICE — SUTURE 0 COATED VICRYL 6-18IN - (12PK/BX)

## (undated) DEVICE — GLOVE SZ 6.5 BIOGEL PI MICRO - PF LF (50PR/BX)

## (undated) DEVICE — KIT ANESTHESIA W/CIRCUIT & 3/LT BAG W/FILTER (20EA/CA)

## (undated) DEVICE — TROCAR Z THREAD 11 X 100 - BLADED (6/BX)

## (undated) DEVICE — DETERGENT RENUZYME PLUS 10 OZ PACKET (50/BX)

## (undated) DEVICE — ELECTRODE DUAL RETURN W/ CORD - (50/PK)

## (undated) DEVICE — SCISSORS 5MM CVD (6EA/BX)

## (undated) DEVICE — KIT ROOM DECONTAMINATION

## (undated) DEVICE — MASK ANESTHESIA ADULT  - (100/CA)

## (undated) DEVICE — NEEDLE INSFL 120MM 14GA VRRS - (20/BX)

## (undated) DEVICE — PACK LAP CHOLE OR - (2EA/CA)

## (undated) DEVICE — BAG RETRIEVAL 10ML (10EA/BX)